# Patient Record
Sex: FEMALE | Race: WHITE | Employment: OTHER | URBAN - METROPOLITAN AREA
[De-identification: names, ages, dates, MRNs, and addresses within clinical notes are randomized per-mention and may not be internally consistent; named-entity substitution may affect disease eponyms.]

---

## 2017-04-11 ENCOUNTER — TRANSFERRED RECORDS (OUTPATIENT)
Dept: HEALTH INFORMATION MANAGEMENT | Facility: CLINIC | Age: 77
End: 2017-04-11

## 2017-07-20 ENCOUNTER — TRANSFERRED RECORDS (OUTPATIENT)
Dept: HEALTH INFORMATION MANAGEMENT | Facility: CLINIC | Age: 77
End: 2017-07-20

## 2017-09-25 ENCOUNTER — HOSPITAL ENCOUNTER (OUTPATIENT)
Dept: CARDIOLOGY | Facility: CLINIC | Age: 77
Discharge: HOME | End: 2017-09-25
Attending: INTERNAL MEDICINE | Admitting: INTERNAL MEDICINE
Payer: MEDICARE

## 2017-09-25 ENCOUNTER — OFFICE VISIT (OUTPATIENT)
Dept: CARDIOLOGY | Facility: CLINIC | Age: 77
End: 2017-09-25
Payer: COMMERCIAL

## 2017-09-25 VITALS
HEIGHT: 63 IN | HEART RATE: 80 BPM | SYSTOLIC BLOOD PRESSURE: 118 MMHG | BODY MASS INDEX: 28.53 KG/M2 | DIASTOLIC BLOOD PRESSURE: 70 MMHG | WEIGHT: 161 LBS

## 2017-09-25 DIAGNOSIS — I48.20 CHRONIC ATRIAL FIBRILLATION (H): Primary | ICD-10-CM

## 2017-09-25 DIAGNOSIS — I48.20 CHRONIC ATRIAL FIBRILLATION (H): ICD-10-CM

## 2017-09-25 PROCEDURE — 93306 TTE W/DOPPLER COMPLETE: CPT | Mod: 26 | Performed by: INTERNAL MEDICINE

## 2017-09-25 PROCEDURE — 99204 OFFICE O/P NEW MOD 45 MIN: CPT | Performed by: INTERNAL MEDICINE

## 2017-09-25 PROCEDURE — 93306 TTE W/DOPPLER COMPLETE: CPT

## 2017-09-25 RX ORDER — WARFARIN SODIUM 4 MG/1
4 TABLET ORAL DAILY
COMMUNITY

## 2017-09-25 RX ORDER — LEVOTHYROXINE SODIUM 75 UG/1
75 TABLET ORAL DAILY
COMMUNITY

## 2017-09-25 RX ORDER — CARVEDILOL 25 MG/1
25 TABLET ORAL 2 TIMES DAILY WITH MEALS
COMMUNITY
End: 2018-01-30

## 2017-09-25 RX ORDER — CLOBETASOL PROPIONATE 0.5 MG/G
OINTMENT TOPICAL 2 TIMES DAILY
COMMUNITY

## 2017-09-25 RX ORDER — LOSARTAN POTASSIUM 100 MG/1
100 TABLET ORAL DAILY
COMMUNITY
End: 2018-01-30

## 2017-09-25 RX ORDER — SPIRONOLACTONE 25 MG/1
12.5 TABLET ORAL DAILY
COMMUNITY
End: 2019-03-08

## 2017-09-25 RX ORDER — DIGOXIN 125 MCG
125 TABLET ORAL DAILY
COMMUNITY

## 2017-09-25 NOTE — LETTER
9/25/2017    Bryce Coley MD    [address] [address]     RE: Mary Odonnell Vianca       Dear Colleague,    I had the pleasure of seeing Mary Coley in the [facility].    It was my pleasure to see your mother, Mary Coley, who is a very pleasant 77-year-old female patient who in 2014 was diagnosed with asymptomatic atrial fibrillation.  She was seen by Dr. Hugh Rivera at [facility].  Because the patient was asymptomatic with the atrial fibrillation, he decided to go with rate controlling strategy using atenolol.  The patient's ejection fraction was 40%-45%.  He performed a coronary angiogram.  The coronary angiogram showed the patient had trivial coronary artery disease, so therefore, the patient really has 2 processes, 1 is chronic atrial fibrillation and the other is cardiomyopathy.  Dr. Rivera did prove that the patient's ventricular rate was well controlled on Holter monitoring with both a combination of atenolol and digoxin with ventricular rates averaging in the 80 beats per minute.        All appeared to be going well until 01/2017.  In 01/2017, the ejection fraction dropped to 30%-35%.  Again, Dr. Rivera correctly switched the patient from atenolol, which is not a beta blocker typically used in congestive heart failure or cardiomyopathy to carvedilol and increased the dose to 25 mg twice a day.  He also added a losartan, which is a typical heart failure and cardiomyopathy strategy.  He also placed the patient on spironolactone, so the treatment for this cardiomyopathy has been excellent.  Also, the Holter monitor was repeated, and again the heart rate is well controlled.  He was hoping the that with the more aggressive medical regimen that the ejection fraction would improved, but in April, the ejection fraction was still in the 30%-35% grady.  The atria are moderately enlarged.  Again, it appears to be 2  processes, 1 is cardiomyopathy and other is the atrial fibrillation.  Cardiomyopathy does run in your mother's family in that her sister also has a cardiomyopathy as well.      Dr. Rivera had the discussion with her mother about the possibility of defibrillator because of course with the EF in the 30%-35% range, she falls within the SCD-HeFT criteria for the implantation of a defibrillator.  The one question that I have is why the ejection fraction dropped from an initial value of 40%-45% to 30%-35%.  Even though the coronary angiogram in 2014 showed trivial disease, that does not necessarily mean that the coronary arteries are still normal, 3 years later.      Today, she is feeling well.  On physical examination, she appears to be euvolemic.  Her ventricular rate is well controlled at 80 beats per minute.  Her blood pressure is excellent at 118/70 and she is on a very good medical regimen for cardiomyopathy and for atrial fibrillation.      Outpatient Encounter Prescriptions as of 9/25/2017   Medication Sig Dispense Refill     calcium-vitamin D (CALTRATE 600+D) 600-400 MG-UNIT per tablet Take 1 tablet by mouth 2 times daily       carvedilol (COREG) 25 MG tablet Take 25 mg by mouth 2 times daily (with meals)       clobetasol (TEMOVATE) 0.05 % ointment Apply topically 2 times daily       digoxin (LANOXIN) 125 MCG tablet Take 125 mcg by mouth daily       levothyroxine (SYNTHROID/LEVOTHROID) 75 MCG tablet Take 75 mcg by mouth daily       losartan (COZAAR) 100 MG tablet Take 100 mg by mouth daily       predniSONE (ABA) 1 MG EC tablet Take 1 mg by mouth At Bedtime 2 tablets daily       ranitidine (ZANTAC) 150 MG tablet Take 150 mg by mouth 2 times daily       spironolactone (ALDACTONE) 25 MG tablet Take 12.5 mg by mouth daily       warfarin (COUMADIN) 4 MG tablet Take 4 mg by mouth daily Take as directed       No facility-administered encounter medications on file as of 9/25/2017.      IMPRESSION:   1.   Cardiomyopathy.  The ejection fraction, which was previously mildly reduced and proven not to be due to coronary artery disease, has worsened and has not responded to optimal medical therapy.   2.  Chronic atrial fibrillation.  The ventricular rate appears to be well controlled and she is correctly anticoagulated with warfarin.   3.  Family history of cardiomyopathy.  Her sister has cardiomyopathy also.   4.  Familial history of atrial fibrillation with the patient having atrial fibrillation but 2 sons also having atrial fibrillation.      PLAN:  Firstly, I do not think that converting the patient back to sinus rhythm would:  a) work now as she has been in atrial fibrillation for at least 3 years and the atria are moderately dilated or b:  I do not think that cardioverting her back to sinus rhythm is going to improve the ejection fraction since she is already well rate controlled and it is the heart rate that causes for cardiomyopathy, not necessarily being in atrial fibrillation or in sinus rhythm.        I do want to repeat the echocardiogram since it has been 5 months since the last echocardiogram to see if the ejection fraction has possibly improved.  I will also obtain a Holter monitor to confirm that the heart rate is well controlled.      If the ejection fraction has improved, then I would continue with the medical therapy.  If the ejection fraction is still in the 30%-35% range, I would perform an invasive coronary angiogram to ensure that there has been no development of coronary artery disease in the meantime.  If the ejection fraction remains reduced, she does fall within the criteria for the placement of an AICD.        I will try and get the echocardiogram done today and have a Holter monitor placed today and I will see her back again in the next few weeks.        Many thanks for allowing me to see your mother.     Sincerely,    Dieon Sorensen MD    [facility]  Care

## 2017-09-25 NOTE — PROGRESS NOTES
HPI and Plan:   See dictation    Orders Placed This Encounter   Procedures     Follow-Up with Cardiologist     Holter Monitor 48 hour - Adult     Echocardiogram       Orders Placed This Encounter   Medications     calcium-vitamin D (CALTRATE 600+D) 600-400 MG-UNIT per tablet     Sig: Take 1 tablet by mouth 2 times daily     carvedilol (COREG) 25 MG tablet     Sig: Take 25 mg by mouth 2 times daily (with meals)     clobetasol (TEMOVATE) 0.05 % ointment     Sig: Apply topically 2 times daily     digoxin (LANOXIN) 125 MCG tablet     Sig: Take 125 mcg by mouth daily     levothyroxine (SYNTHROID/LEVOTHROID) 75 MCG tablet     Sig: Take 75 mcg by mouth daily     losartan (COZAAR) 100 MG tablet     Sig: Take 100 mg by mouth daily     predniSONE (ABA) 1 MG EC tablet     Sig: Take 1 mg by mouth At Bedtime 2 tablets daily     ranitidine (ZANTAC) 150 MG tablet     Sig: Take 150 mg by mouth 2 times daily     spironolactone (ALDACTONE) 25 MG tablet     Sig: Take 12.5 mg by mouth daily     warfarin (COUMADIN) 4 MG tablet     Sig: Take 4 mg by mouth daily Take as directed       There are no discontinued medications.      Encounter Diagnosis   Name Primary?     Chronic atrial fibrillation (H) Yes       CURRENT MEDICATIONS:  Current Outpatient Prescriptions   Medication Sig Dispense Refill     calcium-vitamin D (CALTRATE 600+D) 600-400 MG-UNIT per tablet Take 1 tablet by mouth 2 times daily       carvedilol (COREG) 25 MG tablet Take 25 mg by mouth 2 times daily (with meals)       clobetasol (TEMOVATE) 0.05 % ointment Apply topically 2 times daily       digoxin (LANOXIN) 125 MCG tablet Take 125 mcg by mouth daily       levothyroxine (SYNTHROID/LEVOTHROID) 75 MCG tablet Take 75 mcg by mouth daily       losartan (COZAAR) 100 MG tablet Take 100 mg by mouth daily       predniSONE (ABA) 1 MG EC tablet Take 1 mg by mouth At Bedtime 2 tablets daily       ranitidine (ZANTAC) 150 MG tablet Take 150 mg by mouth 2 times daily        "spironolactone (ALDACTONE) 25 MG tablet Take 12.5 mg by mouth daily       warfarin (COUMADIN) 4 MG tablet Take 4 mg by mouth daily Take as directed         ALLERGIES   Allergies not on file    PAST MEDICAL HISTORY:  No past medical history on file.    PAST SURGICAL HISTORY:  No past surgical history on file.    FAMILY HISTORY:  No family history on file.    SOCIAL HISTORY:  Social History     Social History     Marital status:      Spouse name: N/A     Number of children: N/A     Years of education: N/A     Social History Main Topics     Smoking status: Never Smoker     Smokeless tobacco: Never Used     Alcohol use None     Drug use: None     Sexual activity: Not Asked     Other Topics Concern     None     Social History Narrative     None       Review of Systems:  Skin:  Negative       Eyes:  Negative      ENT:  Negative      Respiratory:  Negative       Cardiovascular:    Positive for;dizziness a-fib  Gastroenterology: Negative      Genitourinary:  Negative      Musculoskeletal:  Negative      Neurologic:  Negative      Psychiatric:  Negative      Heme/Lymph/Imm:  Negative      Endocrine:  Negative        Physical Exam:  Vitals: /70  Pulse 80  Ht 1.588 m (5' 2.5\")  Wt 73 kg (161 lb)  BMI 28.98 kg/m2    Constitutional:  cooperative, alert and oriented, well developed, well nourished, in no acute distress        Skin:  warm and dry to the touch, no apparent skin lesions or masses noted        Head:  normocephalic, no masses or lesions        Eyes:  pupils equal and round, conjunctivae and lids unremarkable, sclera white, no xanthalasma, EOMS intact, no nystagmus        ENT:  no pallor or cyanosis, dentition good        Neck:  carotid pulses are full and equal bilaterally, JVP normal, no carotid bruit, no thyromegaly        Chest:  normal breath sounds, clear to auscultation, normal A-P diameter, normal symmetry, normal respiratory excursion, no use of accessory muscles          Cardiac: regular " rhythm;normal S1 and S2;apical impulse not displaced   S4              Abdomen:  abdomen soft, non-tender, BS normoactive, no mass, no HSM, no bruits        Vascular: pulses full and equal, no bruits auscultated                                        Extremities and Back:  no deformities, clubbing, cyanosis, erythema observed;no edema              Neurological:  affect appropriate, oriented to time, person and place;no gross motor deficits              CC  No referring provider defined for this encounter.

## 2017-09-25 NOTE — MR AVS SNAPSHOT
After Visit Summary   9/25/2017    Mary Coley    MRN: [id number]           Patient Information     Date Of Birth          1940        Visit Information        Provider Department      9/25/2017 10:15 AM Deion Sorensen MD [facility] [place]        Today's Diagnoses     Chronic atrial fibrillation (H)    -  1       Follow-ups after your visit        Additional Services     Follow-Up with Cardiologist                 Your next 10 appointments already scheduled     Sep 25, 2017  3:00 PM CDT   Ech Complete with [id number]   [facility] CV Echocardiography (Cardiovascular Imaging at [facility])    [address]  [address]  [facility] [address] [phone]           1. Please bring or wear a comfortable two-piece outfit. 2. You may eat, drink and take your normal medicines. 3. For any questions that cannot be answered, please contact the ordering physician            Oct 06, 2017  3:45 PM CDT   Holter Monitor with RSCC DEVICE [facility] ([facility])    [address]  [facility] [address] [phone]           LOCATION - [address], [address] [place] [address] **Please check-in at the [place] [facility] Office, [address], in the [facility] building. When you are finished registering, please go to [address] and have a seat.            Oct 24, 2017 12:45 PM CDT   Return Visit with Deion Sorensen MD   [facility] [place] ([facility])    [address]  [facility] [address]   [phone]              Future tests that were ordered for you today     Open Future Orders        Priority Expected Expires Ordered    Follow-Up with Cardiologist Routine 10/9/2017 9/25/2018 9/25/2017    Holter Monitor 48 hour - Adult Routine 10/2/2017 9/25/2018 9/25/2017     "Echocardiogram Routine 2017            Who to contact     If you have questions or need follow up information about today's clinic visit or your schedule please contact [facility] [place] directly at [phone].  Normal or non-critical lab and imaging results will be communicated to you by MyChart, letter or phone within 4 business days after the clinic has received the results. If you do not hear from us within 7 days, please contact the clinic through MyChart or phone. If you have a critical or abnormal lab result, we will notify you by phone as soon as possible.  Submit refill requests through [employer] or call your pharmacy and they will forward the refill request to us. Please allow 3 business days for your refill to be completed.          Additional Information About Your Visit        MyChart Information     [employer] lets you send messages to your doctor, view your test results, renew your prescriptions, schedule appointments and more. To sign up, go to [email or web address] . Click on \"Log in\" on the left side of the screen, which will take you to the Welcome page. Then click on \"Sign up Now\" on the right side of the page.     You will be asked to enter the access code listed below, as well as some personal information. Please follow the directions to create your username and password.     Your access code is: [email or web address]  Expires: 2017 11:04 AM     Your access code will  in 90 days. If you need help or a new code, please call your [place] clinic or [phone].        Care EveryWhere ID     This is your Care EveryWhere ID. This could be used by other organizations to access your [place] medical records  [id number]        Your Vitals Were     Pulse Height BMI (Body Mass Index)             80 1.588 m (5' 2.5\") 28.98 kg/m2          Blood Pressure from Last 3 Encounters:   17 118/70    Weight from Last 3 Encounters:   17 73 " kg (161 lb)               Primary Care Provider    None Specified       No primary provider on file.        Equal Access to Services     JOSÉ HARDIN : Hadii aad ku hadorquideaede Luis, deniseda milagro, freddyjaci dotysaumyasarah salazar, lakshmi lealluisamarianne patel. So [facility] [phone].    ATENCIÓN: Si habla español, tiene a abbasi disposición servicios gratuitos de asistencia lingüística. Llame al [phone].    We comply with applicable federal civil rights laws and [place] laws. We do not discriminate on the basis of race, color, national origin, age, disability sex, sexual orientation or gender identity.            Thank you!     Thank you for choosing [facility] [place]  for your care. Our goal is always to provide you with excellent care. Hearing back from our patients is one way we can continue to improve our services. Please take a few minutes to complete the written survey that you may receive in the mail after your visit with us. Thank you!             Your Updated Medication List - Protect others around you: Learn how to safely use, store and throw away your medicines at www.disposemymeds.org.          This list is accurate as of: 9/25/17 11:04 AM.  Always use your most recent med list.                   Brand Name Dispense Instructions for use Diagnosis    CALTRATE 600+D 600-400 MG-UNIT per tablet   Generic drug:  calcium-vitamin D      Take 1 tablet by mouth 2 times daily        carvedilol 25 MG tablet    COREG     Take 25 mg by mouth 2 times daily (with meals)        clobetasol 0.05 % ointment    TEMOVATE     Apply topically 2 times daily        digoxin 125 MCG tablet    LANOXIN     Take 125 mcg by mouth daily        levothyroxine 75 MCG tablet    SYNTHROID/LEVOTHROID     Take 75 mcg by mouth daily        losartan 100 MG tablet    COZAAR     Take 100 mg by mouth daily        predniSONE 1 MG EC tablet    ABA     Take 1 mg by mouth At Bedtime 2 tablets daily         ranitidine 150 MG tablet    ZANTAC     Take 150 mg by mouth 2 times daily        spironolactone 25 MG tablet    ALDACTONE     Take 12.5 mg by mouth daily        warfarin 4 MG tablet    COUMADIN     Take 4 mg by mouth daily Take as directed

## 2017-09-25 NOTE — PROGRESS NOTES
HISTORY OF PRESENT ILLNESS:  It was my pleasure to see your mother, Mary Coley, who is a very pleasant 77-year-old female patient who in 2014 was diagnosed with asymptomatic atrial fibrillation.  She was seen by Dr. Hugh Rivera at [facility].  Because the patient was asymptomatic with the atrial fibrillation, he decided to go with rate controlling strategy using atenolol.  The patient's ejection fraction was 40%-45%.  He performed a coronary angiogram.  The coronary angiogram showed the patient had trivial coronary artery disease, so therefore, the patient really has 2 processes, 1 is chronic atrial fibrillation and the other is cardiomyopathy.  Dr. Rivera did prove that the patient's ventricular rate was well controlled on Holter monitoring with both a combination of atenolol and digoxin with ventricular rates averaging in the 80 beats per minute.        All appeared to be going well until 01/2017.  In 01/2017, the ejection fraction dropped to 30%-35%.  Again, Dr. Rivera correctly switched the patient from atenolol, which is not a beta blocker typically used in congestive heart failure or cardiomyopathy to carvedilol and increased the dose to 25 mg twice a day.  He also added a losartan, which is a typical heart failure and cardiomyopathy strategy.  He also placed the patient on spironolactone, so the treatment for this cardiomyopathy has been excellent.  Also, the Holter monitor was repeated, and again the heart rate is well controlled.  He was hoping the that with the more aggressive medical regimen that the ejection fraction would improved, but in April, the ejection fraction was still in the 30%-35% grady.  The atria are moderately enlarged.  Again, it appears to be 2 processes, 1 is cardiomyopathy and other is the atrial fibrillation.  Cardiomyopathy does run in your mother's family in that her sister also has a cardiomyopathy as well.      Dr. Rivera had the discussion with her mother about  the possibility of defibrillator because of course with the EF in the 30%-35% range, she falls within the SCD-HeFT criteria for the implantation of a defibrillator.  The one question that I have is why the ejection fraction dropped from an initial value of 40%-45% to 30%-35%.  Even though the coronary angiogram in 2014 showed trivial disease, that does not necessarily mean that the coronary arteries are still normal, 3 years later.      Today, she is feeling well.  On physical examination, she appears to be euvolemic.  Her ventricular rate is well controlled at 80 beats per minute.  Her blood pressure is excellent at 118/70 and she is on a very good medical regimen for cardiomyopathy and for atrial fibrillation.      IMPRESSION:   1.  Cardiomyopathy.  The ejection fraction, which was previously mildly reduced and proven not to be due to coronary artery disease, has worsened and has not responded to optimal medical therapy.   2.  Chronic atrial fibrillation.  The ventricular rate appears to be well controlled and she is correctly anticoagulated with warfarin.   3.  Family history of cardiomyopathy.  Her sister has cardiomyopathy also.   4.  Familial history of atrial fibrillation with the patient having atrial fibrillation but 2 sons also having atrial fibrillation.      PLAN:  Firstly, I do not think that converting the patient back to sinus rhythm would:  a) work now as she has been in atrial fibrillation for at least 3 years and the atria are moderately dilated or b:  I do not think that cardioverting her back to sinus rhythm is going to improve the ejection fraction since she is already well rate controlled and it is the heart rate that causes for cardiomyopathy, not necessarily being in atrial fibrillation or in sinus rhythm.        I do want to repeat the echocardiogram since it has been 5 months since the last echocardiogram to see if the ejection fraction has possibly improved.  I will also obtain a Holter  monitor to confirm that the heart rate is well controlled.      If the ejection fraction has improved, then I would continue with the medical therapy.  If the ejection fraction is still in the 30%-35% range, I would perform an invasive coronary angiogram to ensure that there has been no development of coronary artery disease in the meantime.  If the ejection fraction remains reduced, she does fall within the criteria for the placement of an AICD.        I will try and get the echocardiogram done today and have a Holter monitor placed today and I will see her back again in the next few weeks.      Many thanks for allowing me to see your mother.      cc:      [place] [address]         DANY VALDEZ MD, [facility]             D: 2017 11:07   T: 2017 13:24   MT: ANDREW      Name:     JOHANA COLEY   MRN:      [id number]        Account:      [id number]   :      1940           Service Date: 2017      Document: [id number]

## 2017-10-06 ENCOUNTER — HOSPITAL ENCOUNTER (OUTPATIENT)
Dept: CARDIOLOGY | Facility: CLINIC | Age: 77
Discharge: HOME | End: 2017-10-06
Attending: INTERNAL MEDICINE | Admitting: INTERNAL MEDICINE
Payer: MEDICARE

## 2017-10-06 DIAGNOSIS — I48.20 CHRONIC ATRIAL FIBRILLATION (H): ICD-10-CM

## 2017-10-06 PROCEDURE — 93227 XTRNL ECG REC<48 HR R&I: CPT | Performed by: INTERNAL MEDICINE

## 2017-10-06 PROCEDURE — 93226 XTRNL ECG REC<48 HR SCAN A/R: CPT

## 2017-10-24 ENCOUNTER — OFFICE VISIT (OUTPATIENT)
Dept: CARDIOLOGY | Facility: CLINIC | Age: 77
End: 2017-10-24
Attending: INTERNAL MEDICINE
Payer: COMMERCIAL

## 2017-10-24 VITALS
SYSTOLIC BLOOD PRESSURE: 150 MMHG | WEIGHT: 162.3 LBS | HEART RATE: 80 BPM | DIASTOLIC BLOOD PRESSURE: 82 MMHG | BODY MASS INDEX: 28.76 KG/M2 | HEIGHT: 63 IN

## 2017-10-24 DIAGNOSIS — I48.20 CHRONIC ATRIAL FIBRILLATION (H): ICD-10-CM

## 2017-10-24 DIAGNOSIS — I42.8 OTHER CARDIOMYOPATHY (H): Primary | ICD-10-CM

## 2017-10-24 PROCEDURE — 99213 OFFICE O/P EST LOW 20 MIN: CPT | Performed by: INTERNAL MEDICINE

## 2017-10-24 NOTE — PROGRESS NOTES
REFERRING AND PRIMARY CARE PHYSICIAN:  Sagrario Guerin MD.      HISTORY OF PRESENT ILLNESS:  It was my pleasure to see your patient, Mary Coley, in followup.  She is a very pleasant 77-year-old female patient with chronic atrial fibrillation who was also diagnosed with a cardiomyopathy.  This was shown to be an idiopathic cardiomyopathy as she had coronary angiography in 2014 showing only trivial coronary artery disease.  In January of this year, if you remember, her ejection fraction dropped into the 30%-35% range.  It was unclear why the ejection fraction dropped.  The patient was switched to carvedilol, a beta blocker which is best for cardiomyopathy. With that background in mind, we repeated the echocardiogram on 09/25, and this has shown that the ejection fraction now has significantly improved.  The EF is in the 45%-50% range.  No significant valvular heart disease is present.  The Holter monitor shows that she has good rate control.  The average heart rate was 85 beats per minute with no significant pauses.  The patient therefore has good rate control.  Mrs. Coley feels well.  She has no shortness of breath.  She has no chest discomfort.  She has no ankle edema.      IMPRESSION:   1.  Chronic atrial fibrillation.  The patient has good rate control with an average heart rate of 85 beats per minute.   2.  Idiopathic cardiomyopathy.  The patient's ejection fraction has significantly improved.  She has risen from the 30%-35% range to the 45%-50% range.   3.  Chronically anticoagulated with warfarin without bleeding complications.   4.  Blood pressure was high today, but the most likely reason for this was that she thought her visit was at [place], but it was in [place] and they were rushing down to get here.  I repeated her blood pressure, and it dropped from 150/82 to 142/82 in a matter of 10 minutes.  Her blood pressure normally is very well controlled, and 1 month ago her blood pressure was 118/70.       PLAN:  We will continue the patient with good rate control and anticoagulation.  I will see her back again in 6 months' time.  In a year's time I will repeat her echocardiogram again.  As always, she has been told to contact me if she has any questions or any concerns.  It has been my pleasure to be involved in the care of this very nice patient.      cc:   [place] [address]         DANY VALDEZ MD, [facility]             D: 10/24/2017 13:10   T: 10/24/2017 15:11   MT: NISH      Name:     JOHANA HAWKINS   MRN:      [id number]        Account:      [id number]   :      1940           Service Date: 10/24/2017      Document: [id number]

## 2017-10-24 NOTE — LETTER
10/24/2017    JOSE ROTHMAN  [facility]   [address]  [facility] [address]    RE: Mary Coley       Dear Colleague,    I had the pleasure of seeing Mary Coley in the [facility].    REFERRING AND PRIMARY CARE PHYSICIAN:  Jose Rothman MD.      It was my pleasure to see your patient, Mary Coley, in followup.  She is a very pleasant 77-year-old female patient with chronic atrial fibrillation who was also diagnosed with a cardiomyopathy.  This was shown to be an idiopathic cardiomyopathy as she had coronary angiography in 2014 showing only trivial coronary artery disease.  In January of this year, if you remember, her ejection fraction dropped into the 30%-35% range.  It was unclear why the ejection fraction dropped.  The patient was switched to carvedilol, a beta blocker which is best for cardiomyopathy. With that background in mind, we repeated the echocardiogram on 09/25, and this has shown that the ejection fraction now has significantly improved.  The EF is in the 45%-50% range.  No significant valvular heart disease is present.  The Holter monitor shows that she has good rate control.  The average heart rate was 85 beats per minute with no significant pauses.  The patient therefore has good rate control.  Mrs. Coley feels well.  She has no shortness of breath.  She has no chest discomfort.  She has no ankle edema.     Outpatient Encounter Prescriptions as of 10/24/2017   Medication Sig Dispense Refill     Multiple Vitamins-Minerals (PRESERVISION AREDS PO) Take 1 tablet by mouth daily       OMEPRAZOLE PO Take 20 mg by mouth as needed       calcium-vitamin D (CALTRATE 600+D) 600-400 MG-UNIT per tablet Take 1 tablet by mouth 2 times daily       carvedilol (COREG) 25 MG tablet Take 25 mg by mouth 2 times daily (with meals)       clobetasol (TEMOVATE) 0.05 % ointment Apply topically 2 times daily       digoxin (LANOXIN) 125 MCG tablet Take 125  mcg by mouth daily       levothyroxine (SYNTHROID/LEVOTHROID) 75 MCG tablet Take 75 mcg by mouth daily       losartan (COZAAR) 100 MG tablet Take 100 mg by mouth daily       predniSONE (ABA) 1 MG EC tablet Take 1 mg by mouth At Bedtime 2 tablets daily prn       spironolactone (ALDACTONE) 25 MG tablet Take 12.5 mg by mouth daily       warfarin (COUMADIN) 4 MG tablet Take 4 mg by mouth daily Take as directed       [DISCONTINUED] ranitidine (ZANTAC) 150 MG tablet Take 150 mg by mouth 2 times daily       No facility-administered encounter medications on file as of 10/24/2017.       IMPRESSION:   1.  Chronic atrial fibrillation.  The patient has good rate control with an average heart rate of 85 beats per minute.   2.  Idiopathic cardiomyopathy.  The patient's ejection fraction has significantly improved.  She has risen from the 30%-35% range to the 45%-50% range.   3.  Chronically anticoagulated with warfarin without bleeding complications.   4.  Blood pressure was high today, but the most likely reason for this was that she thought her visit was at [place], but it was in [place] and they were rushing down to get here.  I repeated her blood pressure, and it dropped from 150/82 to 142/82 in a matter of 10 minutes.  Her blood pressure normally is very well controlled, and 1 month ago her blood pressure was 118/70.      PLAN:  We will continue the patient with good rate control and anticoagulation.  I will see her back again in 6 months' time.  In a year's time I will repeat her echocardiogram again.  As always, she has been told to contact me if she has any questions or any concerns.  It has been my pleasure to be involved in the care of this very nice patient.     Again, thank you for allowing me to participate in the care of your patient.      Sincerely,    Deion Sorensen MD     [facility]

## 2017-10-24 NOTE — MR AVS SNAPSHOT
"              After Visit Summary   10/24/2017    Mary Coley    MRN: [id number]           Patient Information     Date Of Birth          1940        Visit Information        Provider Department      10/24/2017 12:45 PM Deion Sorensen MD [facility]        Today's Diagnoses     Other cardiomyopathy (H)    -  1    Chronic atrial fibrillation (H)           Follow-ups after your visit        Additional Services     Follow-Up with Cardiologist                 Future tests that were ordered for you today     Open Future Orders        Priority Expected Expires Ordered    EKG 12-lead complete w/read - Clinics Routine 4/22/2018 10/24/2018 10/24/2017    Follow-Up with Cardiologist Routine 4/22/2018 10/24/2018 10/24/2017            Who to contact     If you have questions or need follow up information about today's clinic visit or your schedule please contact [facility] [place] directly at [phone].  Normal or non-critical lab and imaging results will be communicated to you by Reef Point Systemshart, letter or phone within 4 business days after the clinic has received the results. If you do not hear from us within 7 days, please contact the clinic through Re[employer] Point Systemshart or phone. If you have a critical or abnormal lab result, we will notify you by phone as soon as possible.  Submit refill requests through expressor software or call your pharmacy and they will forward the refill request to us. Please allow 3 business days for your refill to be completed.          Additional Information About Your Visit        Reef Point SystemsharCoraid Information     expressor software lets you send messages to your doctor, view your test results, renew your prescriptions, schedule appointments and more. To sign up, go to [email or web address] . Click on \"Log in\" on the left side of the screen, which will take you to the Welcome page. Then click on \"Sign up Now\" on the right side of the page.     You " "will be asked to enter the access code listed below, as well as some personal information. Please follow the directions to create your username and password.     Your access code is: [email or web address]  Expires: 2017 11:04 AM     Your access code will  in 90 days. If you need help or a new code, please call your [facility] clinic or [phone].        Care EveryWhere ID     This is your Care EveryWhere ID. This could be used by other organizations to access your [facility] medical records  [id number]        Your Vitals Were     Pulse Height BMI (Body Mass Index)             80 1.588 m (5' 2.5\") 29.21 kg/m2          Blood Pressure from Last 3 Encounters:   10/24/17 150/82   17 118/70    Weight from Last 3 Encounters:   10/24/17 73.6 kg (162 lb 4.8 oz)   17 73 kg (161 lb)              We Performed the Following     Follow-Up with Cardiologist          Today's Medication Changes          These changes are accurate as of: 10/24/17  1:11 PM.  If you have any questions, ask your nurse or doctor.               Stop taking these medicines if you haven't already. Please contact your care team if you have questions.     ranitidine 150 MG tablet   Commonly known as:  ZANTAC   Stopped by:  Deion Sorensen MD                    Primary Care Provider Office Phone # Fax #    Sagrario Guerin [phone]       [address] [address]        Equal Access to Services     [facility] AH: Hadii aad ku hadasho Soomaali, waaxda luqadaha, qaybta kaalmada adeegyada, lakshmi archer . So [facility] [phone].    ATENCIÓN: Si habla español, tiene a abbasi disposición servicios gratuitos de asistencia lingüística. Llame al [phone].    We comply with applicable federal civil rights laws and [place] laws. We do not discriminate on the basis of race, color, national origin, age, disability, sex, sexual orientation, or gender identity.          "   Thank you!     Thank you for choosing [facility] [place]  for your care. Our goal is always to provide you with excellent care. Hearing back from our patients is one way we can continue to improve our services. Please take a few minutes to complete the written survey that you may receive in the mail after your visit with us. Thank you!             Your Updated Medication List - Protect others around you: Learn how to safely use, store and throw away your medicines at www.disposemymeds.org.          This list is accurate as of: 10/24/17  1:11 PM.  Always use your most recent med list.                   Brand Name Dispense Instructions for use Diagnosis    CALTRATE 600+D 600-400 MG-UNIT per tablet   Generic drug:  calcium-vitamin D      Take 1 tablet by mouth 2 times daily        carvedilol 25 MG tablet    COREG     Take 25 mg by mouth 2 times daily (with meals)        clobetasol 0.05 % ointment    TEMOVATE     Apply topically 2 times daily        digoxin 125 MCG tablet    LANOXIN     Take 125 mcg by mouth daily        levothyroxine 75 MCG tablet    SYNTHROID/LEVOTHROID     Take 75 mcg by mouth daily        losartan 100 MG tablet    COZAAR     Take 100 mg by mouth daily        OMEPRAZOLE PO      Take 20 mg by mouth as needed        predniSONE 1 MG EC tablet    ABA     Take 1 mg by mouth At Bedtime 2 tablets daily prn        PRESERVISION AREDS PO      Take 1 tablet by mouth daily        spironolactone 25 MG tablet    ALDACTONE     Take 12.5 mg by mouth daily        warfarin 4 MG tablet    COUMADIN     Take 4 mg by mouth daily Take as directed

## 2017-10-24 NOTE — PROGRESS NOTES
HPI and Plan:   See dictation    Orders Placed This Encounter   Procedures     Follow-Up with Cardiologist     EKG 12-lead complete w/read - Clinics       Orders Placed This Encounter   Medications     Multiple Vitamins-Minerals (PRESERVISION AREDS PO)     Sig: Take 1 tablet by mouth daily     OMEPRAZOLE PO     Sig: Take 20 mg by mouth as needed       Medications Discontinued During This Encounter   Medication Reason     ranitidine (ZANTAC) 150 MG tablet          Encounter Diagnoses   Name Primary?     Chronic atrial fibrillation (H)      Other cardiomyopathy (H) Yes       CURRENT MEDICATIONS:  Current Outpatient Prescriptions   Medication Sig Dispense Refill     Multiple Vitamins-Minerals (PRESERVISION AREDS PO) Take 1 tablet by mouth daily       OMEPRAZOLE PO Take 20 mg by mouth as needed       calcium-vitamin D (CALTRATE 600+D) 600-400 MG-UNIT per tablet Take 1 tablet by mouth 2 times daily       carvedilol (COREG) 25 MG tablet Take 25 mg by mouth 2 times daily (with meals)       clobetasol (TEMOVATE) 0.05 % ointment Apply topically 2 times daily       digoxin (LANOXIN) 125 MCG tablet Take 125 mcg by mouth daily       levothyroxine (SYNTHROID/LEVOTHROID) 75 MCG tablet Take 75 mcg by mouth daily       losartan (COZAAR) 100 MG tablet Take 100 mg by mouth daily       predniSONE (ABA) 1 MG EC tablet Take 1 mg by mouth At Bedtime 2 tablets daily prn       spironolactone (ALDACTONE) 25 MG tablet Take 12.5 mg by mouth daily       warfarin (COUMADIN) 4 MG tablet Take 4 mg by mouth daily Take as directed         ALLERGIES   Allergies not on file    PAST MEDICAL HISTORY:  No past medical history on file.    PAST SURGICAL HISTORY:  No past surgical history on file.    FAMILY HISTORY:  No family history on file.    SOCIAL HISTORY:  Social History     Social History     Marital status:      Spouse name: N/A     Number of children: N/A     Years of education: N/A     Social History Main Topics     Smoking status:  "Never Smoker     Smokeless tobacco: Never Used     Alcohol use None     Drug use: None     Sexual activity: Not Asked     Other Topics Concern     None     Social History Narrative       Review of Systems:  Skin:  Negative       Eyes:  Negative      ENT:  Negative      Respiratory:  Negative       Cardiovascular:    Positive for;dizziness (when standing too fast)    Gastroenterology: Negative      Genitourinary:  Negative      Musculoskeletal:  Positive for arthritis    Neurologic:  Negative      Psychiatric:  Negative      Heme/Lymph/Imm:  Negative      Endocrine:  Negative        Physical Exam:  Vitals: /82  Pulse 80  Ht 1.588 m (5' 2.5\")  Wt 73.6 kg (162 lb 4.8 oz)  BMI 29.21 kg/m2    Constitutional:  cooperative, alert and oriented, well developed, well nourished, in no acute distress        Skin:  warm and dry to the touch, no apparent skin lesions or masses noted        Head:  normocephalic, no masses or lesions        Eyes:  pupils equal and round, conjunctivae and lids unremarkable, sclera white, no xanthalasma, EOMS intact, no nystagmus        ENT:  no pallor or cyanosis, dentition good        Neck:  carotid pulses are full and equal bilaterally, JVP normal, no carotid bruit, no thyromegaly        Chest:  normal breath sounds, clear to auscultation, normal A-P diameter, normal symmetry, normal respiratory excursion, no use of accessory muscles          Cardiac: regular rhythm;normal S1 and S2;apical impulse not displaced   S4              Abdomen:  abdomen soft, non-tender, BS normoactive, no mass, no HSM, no bruits        Vascular: pulses full and equal, no bruits auscultated                                        Extremities and Back:  no deformities, clubbing, cyanosis, erythema observed;no edema              Neurological:  affect appropriate, oriented to time, person and place;no gross motor deficits              CC  Deion Sorensen MD  [address]  [place] " [address]

## 2018-01-30 DIAGNOSIS — I25.5 ISCHEMIC CARDIOMYOPATHY: ICD-10-CM

## 2018-01-30 DIAGNOSIS — I48.91 ATRIAL FIBRILLATION (H): Primary | ICD-10-CM

## 2018-01-30 RX ORDER — LOSARTAN POTASSIUM 100 MG/1
100 TABLET ORAL DAILY
Qty: 90 TABLET | Refills: 3 | Status: SHIPPED | OUTPATIENT
Start: 2018-01-30 | End: 2019-01-18

## 2018-01-30 RX ORDER — CARVEDILOL 25 MG/1
25 TABLET ORAL 2 TIMES DAILY WITH MEALS
Qty: 180 TABLET | Refills: 3 | Status: SHIPPED | OUTPATIENT
Start: 2018-01-30 | End: 2019-01-18

## 2018-05-18 ENCOUNTER — OFFICE VISIT (OUTPATIENT)
Dept: CARDIOLOGY | Facility: CLINIC | Age: 78
End: 2018-05-18
Attending: INTERNAL MEDICINE
Payer: COMMERCIAL

## 2018-05-18 VITALS
BODY MASS INDEX: 28.53 KG/M2 | HEART RATE: 73 BPM | WEIGHT: 161 LBS | SYSTOLIC BLOOD PRESSURE: 118 MMHG | DIASTOLIC BLOOD PRESSURE: 71 MMHG | HEIGHT: 63 IN

## 2018-05-18 DIAGNOSIS — I42.8 OTHER CARDIOMYOPATHY (H): ICD-10-CM

## 2018-05-18 DIAGNOSIS — I48.20 CHRONIC ATRIAL FIBRILLATION (H): ICD-10-CM

## 2018-05-18 PROCEDURE — 99213 OFFICE O/P EST LOW 20 MIN: CPT | Performed by: INTERNAL MEDICINE

## 2018-05-18 PROCEDURE — 93000 ELECTROCARDIOGRAM COMPLETE: CPT | Performed by: INTERNAL MEDICINE

## 2018-05-18 NOTE — MR AVS SNAPSHOT
After Visit Summary   5/18/2018    Mary Coley    MRN: [id number]           Patient Information     Date Of Birth          1940        Visit Information        Provider Department      5/18/2018 9:45 AM Deion Sorensen MD [facility]        Today's Diagnoses     Chronic atrial fibrillation (H)        Other cardiomyopathy (H)           Follow-ups after your visit        Additional Services     Follow-Up with Cardiologist                 Future tests that were ordered for you today     Open Future Orders        Priority Expected Expires Ordered    Digoxin level Routine 11/19/2018 5/18/2019 5/18/2018    Basic metabolic panel Routine 11/14/2018 5/18/2019 5/18/2018    Echocardiogram Routine 11/14/2018 5/18/2019 5/18/2018    Basic metabolic panel Routine 11/14/2018 5/18/2019 5/18/2018    Follow-Up with Cardiologist Routine 11/14/2018 5/18/2019 5/18/2018            Who to contact     If you have questions or need follow up information about today's clinic visit or your schedule please contact [facility] directly at [phone].  Normal or non-critical lab and imaging results will be communicated to you by Lifefactoryhart, letter or phone within 4 business days after the clinic has received the results. If you do not hear from us within 7 days, please contact the clinic through Dartfisht or phone. If you have a critical or abnormal lab result, we will notify you by phone as soon as possible.  Submit refill requests through SocialPicks or call your pharmacy and they will forward the refill request to us. Please allow 3 business days for your refill to be completed.          Additional Information About Your Visit        MyChart Information     SocialPicks lets you send messages to your doctor, view your test results, renew your prescriptions, schedule appointments and more. To sign up, go to [email or web address] .  "Click on \"Log in\" on the left side of the screen, which will take you to the Welcome page. Then click on \"Sign up Now\" on the right side of the page.     You will be asked to enter the access code listed below, as well as some personal information. Please follow the directions to create your username and password.     Your access code is: [email or web address]  Expires: 2018 10:34 AM     Your access code will  in 90 days. If you need help or a new code, please call your [facility] clinic or [phone].        Care EveryWhere ID     This is your Care EveryWhere ID. This could be used by other organizations to access your [facility] medical records  [id number]        Your Vitals Were     Pulse Height BMI (Body Mass Index)             73 1.588 m (5' 2.5\") 28.98 kg/m2          Blood Pressure from Last 3 Encounters:   18 118/71   10/24/17 150/82   17 118/70    Weight from Last 3 Encounters:   18 73 kg (161 lb)   10/24/17 73.6 kg (162 lb 4.8 oz)   17 73 kg (161 lb)              We Performed the Following     EKG 12-lead complete w/read - Clinics (performed today)     Follow-Up with Cardiologist        Primary Care Provider Office Phone # Fax #    Sagrario Guerin [phone]       [address] [address]        Equal Access to Services     JOSÉ HARDIN : Hadii aad ku hadasho Soomaali, waaxda luqadaha, qaybta kaalmada adeegyada, lakshmi archer . So [facility] [phone].    ATENCIÓN: Si habla devinañol, tiene a abbasi disposición servicios gratuitos de asistencia lingüística. Llame al [phone].    We comply with applicable federal civil rights laws and [place] laws. We do not discriminate on the basis of race, color, national origin, age, disability, sex, sexual orientation, or gender identity.            Thank you!     Thank you for choosing [facility]  for your care. Our goal is " always to provide you with excellent care. Hearing back from our patients is one way we can continue to improve our services. Please take a few minutes to complete the written survey that you may receive in the mail after your visit with us. Thank you!             Your Updated Medication List - Protect others around you: Learn how to safely use, store and throw away your medicines at www.disposemymeds.org.          This list is accurate as of 5/18/18 10:34 AM.  Always use your most recent med list.                   Brand Name Dispense Instructions for use Diagnosis    CALTRATE 600+D 600-400 MG-UNIT per tablet   Generic drug:  calcium-vitamin D      Take 1 tablet by mouth 2 times daily        carvedilol 25 MG tablet    COREG    180 tablet    Take 1 tablet (25 mg) by mouth 2 times daily (with meals)    Atrial fibrillation (H)       clobetasol 0.05 % ointment    TEMOVATE     Apply topically 2 times daily        digoxin 125 MCG tablet    LANOXIN     Take 125 mcg by mouth daily        levothyroxine 75 MCG tablet    SYNTHROID/LEVOTHROID     Take 75 mcg by mouth daily 100 mcg daily        losartan 100 MG tablet    COZAAR    90 tablet    Take 1 tablet (100 mg) by mouth daily    Ischemic cardiomyopathy       OMEPRAZOLE PO      Take 20 mg by mouth as needed        predniSONE 1 MG EC tablet    ABA     Take 1 mg by mouth At Bedtime 2 tablets daily prn        PRESERVISION AREDS PO      Take 1 tablet by mouth daily        spironolactone 25 MG tablet    ALDACTONE     Take 12.5 mg by mouth daily        warfarin 4 MG tablet    COUMADIN     Take 4 mg by mouth daily Take as directed

## 2018-05-18 NOTE — PROGRESS NOTES
Service Date: 05/18/2018      REFERRING PHYSICIAN:  Dr. Sagrario Guerin.       HISTORY OF PRESENT ILLNESS:  It is my pleasure to see your patient, Mary Coley, in followup.  As you know, Mrs. Coley is a 78-year-old female patient with chronic atrial fibrillation.  The patient was noted to have a cardiomyopathy with an EF in the 30-35% range.  She did have a coronary angiogram performed which showed only trivial coronary artery disease.  It was felt that the cardiomyopathy was either due to chronic atrial fibrillation with less than optimal rate control or a primary cardiomyopathy.  The patient was switched from atenolol to carvedilol, a drug which is more appropriate for cardiomyopathy, and her last echocardiogram which was performed on 09/25/2017 showed that her ejection fraction had increased to the 45-50% range.      With that background in mind, she is feeling well.  She has no chest pains, no chest pressure and no shortness of breath.  She does not have any ankle edema, orthopnea or PND.  She is a very active person and is able to do her activities without any difficulty at all.  Her EKG showed that her ventricular rate is well controlled today.  The ventricular rate was 64 beats per minute.  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      IMPRESSION:   1.  Chronic atrial fibrillation.  The patient is anticoagulated with warfarin and has good rate control with both carvedilol and digoxin.   2.  Normotensive.   3.  Trivial coronary artery disease on coronary angiography.   4.  Essential hypertension with good blood pressure control.      PLAN:  I will see the patient back again in 6 months' time and at that time we will repeat her echocardiogram to look at left ventricular systolic function.  We should also check a trough digoxin level at that time.  We will also check a basic metabolic profile as she is on chronic spironolactone therapy to ensure that she is not  becoming hyperkalemic.  It is my pleasure to be involved in the care of this very nice patient.  I look forward to seeing her again.      cc:   [place] [address]         DANY VALDEZ MD, [facility]             D: 2018   T: 2018   MT: ADINA      Name:     JOHANA HAWKINS   MRN:      [id number]        Account:      [id number]   :      1940           Service Date: 2018      Document: [id number]

## 2018-05-18 NOTE — LETTER
5/18/2018      JOSE ROTHMAN  [facility] [address]  [facility] [address]      RE: Mary Coley       Dear Colleague,    I had the pleasure of seeing Mary Coley in the [facility].    Service Date: 05/18/2018      REFERRING PHYSICIAN:  Dr. Jose Rothman.       HISTORY OF PRESENT ILLNESS:  It is my pleasure to see your patient, Mary Coley, in followup.  As you know, Mrs. Coley is a 78-year-old female patient with chronic atrial fibrillation.  The patient was noted to have a cardiomyopathy with an EF in the 30-35% range.  She did have a coronary angiogram performed which showed only trivial coronary artery disease.  It was felt that the cardiomyopathy was either due to chronic atrial fibrillation with less than optimal rate control or a primary cardiomyopathy.  The patient was switched from atenolol to carvedilol, a drug which is more appropriate for cardiomyopathy, and her last echocardiogram which was performed on 09/25/2017 showed that her ejection fraction had increased to the 45-50% range.      With that background in mind, she is feeling well.  She has no chest pains, no chest pressure and no shortness of breath.  She does not have any ankle edema, orthopnea or PND.  She is a very active person and is able to do her activities without any difficulty at all.  Her EKG showed that her ventricular rate is well controlled today.  The ventricular rate was 64 beats per minute.  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      IMPRESSION:   1.  Chronic atrial fibrillation.  The patient is anticoagulated with warfarin and has good rate control with both carvedilol and digoxin.   2.  Normotensive.   3.  Trivial coronary artery disease on coronary angiography.   4.  Essential hypertension with good blood pressure control.      PLAN:  I will see the patient back again in 6 months' time and at that time we will  repeat her echocardiogram to look at left ventricular systolic function.  We should also check a trough digoxin level at that time.  We will also check a basic metabolic profile as she is on chronic spironolactone therapy to ensure that she is not becoming hyperkalemic.  It is my pleasure to be involved in the care of this very nice patient.  I look forward to seeing her again.      cc:   [place] [address]         DEION VALDEZ MD, [facility]             D: 2018   T: 2018   MT: ADINA      Name:     JOHANA HAWKINS   MRN:      [id number]        Account:      [id number]   :      1940           Service Date: 2018      Document: [id number]         Outpatient Encounter Prescriptions as of 2018   Medication Sig Dispense Refill     calcium-vitamin D (CALTRATE 600+D) 600-400 MG-UNIT per tablet Take 1 tablet by mouth 2 times daily       carvedilol (COREG) 25 MG tablet Take 1 tablet (25 mg) by mouth 2 times daily (with meals) 180 tablet 3     clobetasol (TEMOVATE) 0.05 % ointment Apply topically 2 times daily       digoxin (LANOXIN) 125 MCG tablet Take 125 mcg by mouth daily       levothyroxine (SYNTHROID/LEVOTHROID) 75 MCG tablet Take 75 mcg by mouth daily 100 mcg daily       losartan (COZAAR) 100 MG tablet Take 1 tablet (100 mg) by mouth daily 90 tablet 3     Multiple Vitamins-Minerals (PRESERVISION AREDS PO) Take 1 tablet by mouth daily       OMEPRAZOLE PO Take 20 mg by mouth as needed       predniSONE (ABA) 1 MG EC tablet Take 1 mg by mouth At Bedtime 2 tablets daily prn       spironolactone (ALDACTONE) 25 MG tablet Take 12.5 mg by mouth daily       warfarin (COUMADIN) 4 MG tablet Take 4 mg by mouth daily Take as directed       No facility-administered encounter medications on file as of 2018.        Again, thank you for allowing me to participate in the care of your patient.      Sincerely,    Deion Rivas  MD Edu, MD     [facility]

## 2018-05-18 NOTE — PROGRESS NOTES
HPI and Plan:   See dictation    Orders Placed This Encounter   Procedures     Basic metabolic panel     Basic metabolic panel     Digoxin level     Follow-Up with Cardiologist     EKG 12-lead complete w/read - Clinics (performed today)     Echocardiogram       No orders of the defined types were placed in this encounter.      There are no discontinued medications.      Encounter Diagnoses   Name Primary?     Chronic atrial fibrillation (H)      Other cardiomyopathy (H)        CURRENT MEDICATIONS:  Current Outpatient Prescriptions   Medication Sig Dispense Refill     calcium-vitamin D (CALTRATE 600+D) 600-400 MG-UNIT per tablet Take 1 tablet by mouth 2 times daily       carvedilol (COREG) 25 MG tablet Take 1 tablet (25 mg) by mouth 2 times daily (with meals) 180 tablet 3     clobetasol (TEMOVATE) 0.05 % ointment Apply topically 2 times daily       digoxin (LANOXIN) 125 MCG tablet Take 125 mcg by mouth daily       levothyroxine (SYNTHROID/LEVOTHROID) 75 MCG tablet Take 75 mcg by mouth daily 100 mcg daily       losartan (COZAAR) 100 MG tablet Take 1 tablet (100 mg) by mouth daily 90 tablet 3     Multiple Vitamins-Minerals (PRESERVISION AREDS PO) Take 1 tablet by mouth daily       OMEPRAZOLE PO Take 20 mg by mouth as needed       predniSONE (ABA) 1 MG EC tablet Take 1 mg by mouth At Bedtime 2 tablets daily prn       spironolactone (ALDACTONE) 25 MG tablet Take 12.5 mg by mouth daily       warfarin (COUMADIN) 4 MG tablet Take 4 mg by mouth daily Take as directed         ALLERGIES     Allergies   Allergen Reactions     Iodinated Diagnostic Agents [Diagnostic X-Ray Materials]        PAST MEDICAL HISTORY:  No past medical history on file.    PAST SURGICAL HISTORY:  No past surgical history on file.    FAMILY HISTORY:  History reviewed. No pertinent family history.    SOCIAL HISTORY:  Social History     Social History     Marital status:      Spouse name: N/A     Number of children: N/A     Years of education: N/A  "    Social History Main Topics     Smoking status: Never Smoker     Smokeless tobacco: Never Used     Alcohol use None     Drug use: None     Sexual activity: Not Asked     Other Topics Concern     None     Social History Narrative       Review of Systems:  Skin:  Positive for   sore in left leg   Eyes:  Positive for visual blurring    ENT:  Negative for      Respiratory:  Negative for       Cardiovascular:  Negative      Gastroenterology: Positive for diarrhea;dysphagia    Genitourinary:  not assessed      Musculoskeletal:  Positive for arthritis    Neurologic:  Negative for      Psychiatric:  Negative      Heme/Lymph/Imm:  Negative      Endocrine:  Positive for thyroid disorder      Physical Exam:  Vitals: /71  Pulse 73  Ht 1.588 m (5' 2.5\")  Wt 73 kg (161 lb)  BMI 28.98 kg/m2    Constitutional:  cooperative, alert and oriented, well developed, well nourished, in no acute distress        Skin:  warm and dry to the touch, no apparent skin lesions or masses noted          Head:  normocephalic, no masses or lesions        Eyes:  pupils equal and round, conjunctivae and lids unremarkable, sclera white, no xanthalasma, EOMS intact, no nystagmus        Lymph:      ENT:  no pallor or cyanosis, dentition good        Neck:  carotid pulses are full and equal bilaterally, JVP normal, no carotid bruit        Respiratory:  normal breath sounds, clear to auscultation, normal A-P diameter, normal symmetry, normal respiratory excursion, no use of accessory muscles         Cardiac: regular rhythm;normal S1 and S2;apical impulse not displaced   S4            pulses full and equal, no bruits auscultated                                        GI:  abdomen soft, non-tender, BS normoactive, no mass, no HSM, no bruits        Extremities and Muscular Skeletal:  no deformities, clubbing, cyanosis, erythema observed;no edema              Neurological:  no gross motor deficits;affect appropriate        Psych:  Alert and Oriented x " 3        CC  Deion Sorensen MD  [address]  [place] [address]

## 2018-05-18 NOTE — LETTER
5/18/2018    [facility] [address]  [facility] [address]    RE: Mary Coley       Dear Colleague,    I had the pleasure of seeing Mary Coley in the [facility].    HPI and Plan:   See dictation    Orders Placed This Encounter   Procedures     Basic metabolic panel     Basic metabolic panel     Digoxin level     Follow-Up with Cardiologist     EKG 12-lead complete w/read - Clinics (performed today)     Echocardiogram       No orders of the defined types were placed in this encounter.      There are no discontinued medications.      Encounter Diagnoses   Name Primary?     Chronic atrial fibrillation (H)      Other cardiomyopathy (H)        CURRENT MEDICATIONS:  Current Outpatient Prescriptions   Medication Sig Dispense Refill     calcium-vitamin D (CALTRATE 600+D) 600-400 MG-UNIT per tablet Take 1 tablet by mouth 2 times daily       carvedilol (COREG) 25 MG tablet Take 1 tablet (25 mg) by mouth 2 times daily (with meals) 180 tablet 3     clobetasol (TEMOVATE) 0.05 % ointment Apply topically 2 times daily       digoxin (LANOXIN) 125 MCG tablet Take 125 mcg by mouth daily       levothyroxine (SYNTHROID/LEVOTHROID) 75 MCG tablet Take 75 mcg by mouth daily 100 mcg daily       losartan (COZAAR) 100 MG tablet Take 1 tablet (100 mg) by mouth daily 90 tablet 3     Multiple Vitamins-Minerals (PRESERVISION AREDS PO) Take 1 tablet by mouth daily       OMEPRAZOLE PO Take 20 mg by mouth as needed       predniSONE (ABA) 1 MG EC tablet Take 1 mg by mouth At Bedtime 2 tablets daily prn       spironolactone (ALDACTONE) 25 MG tablet Take 12.5 mg by mouth daily       warfarin (COUMADIN) 4 MG tablet Take 4 mg by mouth daily Take as directed         ALLERGIES     Allergies   Allergen Reactions     Iodinated Diagnostic Agents [Diagnostic X-Ray Materials]        PAST MEDICAL HISTORY:  No past medical history on file.    PAST SURGICAL HISTORY:  No past surgical  "history on file.    FAMILY HISTORY:  History reviewed. No pertinent family history.    SOCIAL HISTORY:  Social History     Social History     Marital status:      Spouse name: N/A     Number of children: N/A     Years of education: N/A     Social History Main Topics     Smoking status: Never Smoker     Smokeless tobacco: Never Used     Alcohol use None     Drug use: None     Sexual activity: Not Asked     Other Topics Concern     None     Social History Narrative       Review of Systems:  Skin:  Positive for   sore in left leg   Eyes:  Positive for visual blurring    ENT:  Negative for      Respiratory:  Negative for       Cardiovascular:  Negative      Gastroenterology: Positive for diarrhea;dysphagia    Genitourinary:  not assessed      Musculoskeletal:  Positive for arthritis    Neurologic:  Negative for      Psychiatric:  Negative      Heme/Lymph/Imm:  Negative      Endocrine:  Positive for thyroid disorder      Physical Exam:  Vitals: /71  Pulse 73  Ht 1.588 m (5' 2.5\")  Wt 73 kg (161 lb)  BMI 28.98 kg/m2    Constitutional:  cooperative, alert and oriented, well developed, well nourished, in no acute distress        Skin:  warm and dry to the touch, no apparent skin lesions or masses noted          Head:  normocephalic, no masses or lesions        Eyes:  pupils equal and round, conjunctivae and lids unremarkable, sclera white, no xanthalasma, EOMS intact, no nystagmus        Lymph:      ENT:  no pallor or cyanosis, dentition good        Neck:  carotid pulses are full and equal bilaterally, JVP normal, no carotid bruit        Respiratory:  normal breath sounds, clear to auscultation, normal A-P diameter, normal symmetry, normal respiratory excursion, no use of accessory muscles         Cardiac: regular rhythm;normal S1 and S2;apical impulse not displaced   S4            pulses full and equal, no bruits auscultated                                        GI:  abdomen soft, non-tender, BS " normoactive, no mass, no HSM, no bruits        Extremities and Muscular Skeletal:  no deformities, clubbing, cyanosis, erythema observed;no edema              Neurological:  no gross motor deficits;affect appropriate        Psych:  Alert and Oriented x 3        CC  Deion Sorensen MD  [address]  [place] [address]                Thank you for allowing me to participate in the care of your patient.      Sincerely,     Deion Sorensen MD, MD     [facility]    cc:   Deion Sornesen MD  [address]  [place] [address]

## 2019-01-18 DIAGNOSIS — I48.91 ATRIAL FIBRILLATION (H): ICD-10-CM

## 2019-01-18 DIAGNOSIS — I25.5 ISCHEMIC CARDIOMYOPATHY: ICD-10-CM

## 2019-01-18 RX ORDER — CARVEDILOL 25 MG/1
25 TABLET ORAL 2 TIMES DAILY WITH MEALS
Qty: 180 TABLET | Refills: 3 | Status: SHIPPED | OUTPATIENT
Start: 2019-01-18

## 2019-01-18 RX ORDER — LOSARTAN POTASSIUM 100 MG/1
100 TABLET ORAL DAILY
Qty: 90 TABLET | Refills: 1 | Status: SHIPPED | OUTPATIENT
Start: 2019-01-18

## 2019-03-08 ENCOUNTER — OFFICE VISIT (OUTPATIENT)
Dept: CARDIOLOGY | Facility: CLINIC | Age: 79
End: 2019-03-08
Attending: INTERNAL MEDICINE
Payer: COMMERCIAL

## 2019-03-08 ENCOUNTER — HOSPITAL ENCOUNTER (OUTPATIENT)
Dept: CARDIOLOGY | Facility: CLINIC | Age: 79
Discharge: HOME | End: 2019-03-08
Attending: INTERNAL MEDICINE | Admitting: INTERNAL MEDICINE
Payer: MEDICARE

## 2019-03-08 VITALS
SYSTOLIC BLOOD PRESSURE: 128 MMHG | HEART RATE: 72 BPM | BODY MASS INDEX: 24.4 KG/M2 | HEIGHT: 63 IN | DIASTOLIC BLOOD PRESSURE: 74 MMHG | WEIGHT: 137.7 LBS

## 2019-03-08 DIAGNOSIS — I42.8 OTHER CARDIOMYOPATHY (H): ICD-10-CM

## 2019-03-08 DIAGNOSIS — I48.20 CHRONIC ATRIAL FIBRILLATION (H): ICD-10-CM

## 2019-03-08 LAB
ANION GAP SERPL CALCULATED.3IONS-SCNC: 5 MMOL/L (ref 3–14)
BUN SERPL-MCNC: 19 MG/DL (ref 7–30)
CALCIUM SERPL-MCNC: 8.6 MG/DL (ref 8.5–10.1)
CHLORIDE SERPL-SCNC: 108 MMOL/L (ref 94–109)
CO2 SERPL-SCNC: 27 MMOL/L (ref 20–32)
CREAT SERPL-MCNC: 1.04 MG/DL (ref 0.52–1.04)
DIGOXIN SERPL-MCNC: 0.9 UG/L (ref 0.5–2)
GFR SERPL CREATININE-BSD FRML MDRD: 51 ML/MIN/{1.73_M2}
GLUCOSE SERPL-MCNC: 97 MG/DL (ref 70–99)
POTASSIUM SERPL-SCNC: 3.9 MMOL/L (ref 3.4–5.3)
SODIUM SERPL-SCNC: 140 MMOL/L (ref 133–144)

## 2019-03-08 PROCEDURE — 80048 BASIC METABOLIC PNL TOTAL CA: CPT | Performed by: INTERNAL MEDICINE

## 2019-03-08 PROCEDURE — 80162 ASSAY OF DIGOXIN TOTAL: CPT | Performed by: INTERNAL MEDICINE

## 2019-03-08 PROCEDURE — 36415 COLL VENOUS BLD VENIPUNCTURE: CPT | Performed by: INTERNAL MEDICINE

## 2019-03-08 PROCEDURE — 93306 TTE W/DOPPLER COMPLETE: CPT

## 2019-03-08 PROCEDURE — 99213 OFFICE O/P EST LOW 20 MIN: CPT | Mod: 25 | Performed by: INTERNAL MEDICINE

## 2019-03-08 PROCEDURE — 93306 TTE W/DOPPLER COMPLETE: CPT | Mod: 26 | Performed by: INTERNAL MEDICINE

## 2019-03-08 RX ORDER — SPIRONOLACTONE 25 MG/1
12.5 TABLET ORAL DAILY
Qty: 90 TABLET | Refills: 3 | Status: SHIPPED | OUTPATIENT
Start: 2019-03-08

## 2019-03-08 ASSESSMENT — MIFFLIN-ST. JEOR: SCORE: 1065.79

## 2019-03-08 NOTE — LETTER
3/8/2019      SAGRARIO ROTHMAN  [facility] [address]  [facility] [address]      RE: Mary Coley       Dear Colleague,    I had the pleasure of seeing Mary Coley in the [facility].    Service Date: 03/08/2019      REFERRING PHYSICIAN:  Dr. Sagrario Rothman.        HISTORY OF PRESENT ILLNESS:  It is my pleasure to see your patient, Mary Coley, who is a very pleasant 78-year-old patient with an idiopathic cardiomyopathy.  Initially, it was felt that this might be a rate-related cardiomyopathy and she was switched from atenolol to carvedilol with good rate control.  Also, the patient did have a coronary angiogram showing only trivial coronary artery disease.  Her echocardiogram has shown ejection fractions around the 45% grady and echocardiography today showed EF in the 40%-45% range, which is around the same as it had been previously.  No significant valvular heart disease is present.  She has mild mitral, mild tricuspid and mild to moderate pulmonary regurgitation.  She has trace aortic regurgitation.  The ascending aorta is mildly dilated at 3.9 cm.  She recently had quite a significant urinary tract infection and in fact fell down due to weakness.  She did not in fact lose consciousness.  She was hypotensive with sepsis and a significant amount of her medications were stopped.  They have been reintroduced, but the spironolactone was not reintroduced which is a medication that we would like to continue given that she has a history of cardiomyopathy.  She is anticoagulated with warfarin for the chronic atrial fibrillation.  Her trough digoxin level today was normal at 0.9.  She has mild renal insufficiency with a GFR of 51, BUN of 19 and a creatinine of 1.04.  Her electrolytes are normal with a potassium of 3.9 and a sodium of 140.        She does not have orthopnea, PND or ankle edema.      IMPRESSION:   1.  Idiopathic cardiomyopathy.  Ejection  fraction is in the 40%-45% range which is similar to what it has been previously.   2.  Recent urinary tract infection with significant hypotension and fall.  Spironolactone was stopped at the time and she tells me a lot of her other medications were stopped, but reintroduced.  I would prefer to reintroduce the spironolactone 12.5 mg again as this is an important medication for cardiomyopathy and she has never had problems with it previously.  Obviously, the hypotension in the setting of urinary tract infection was a special case.   3.  Chronic atrial fibrillation with good rate control with a ventricular rate today of 72 beats per minute.  Normotensive with a blood pressure of 128/74.      PLAN:  We will continue the patient on her good present medications.  I will reintroduce the spironolactone at the 12.5 mg oral dose.  I will see the patient back again in 1 year's time and we will repeat her echocardiogram and an EKG at that stage.  As always, she has been told to contact us if she has any questions or concerns, and in particular if she finds she is getting lightheaded or dizzy with the reintroduction of spironolactone she is to stop this and let us know.      Many thanks for allowing me to be involved in the care of this very nice patient.  I look forward to seeing her again.      cc:   [place] [address]         DANY VALDEZ MD, [facility]             D: 2019   T: 2019   MT: ADINA      Name:     JOHANA HAWKINS   MRN:      [id number]        Account:      [id number]   :      1940           Service Date: 2019      Document: [id number]         Outpatient Encounter Medications as of 3/8/2019   Medication Sig Dispense Refill     calcium-vitamin D (CALTRATE 600+D) 600-400 MG-UNIT per tablet Take 1 tablet by mouth 2 times daily       carvedilol (COREG) 25 MG tablet Take 1 tablet (25 mg) by mouth 2 times daily  (with meals) 180 tablet 3     digoxin (LANOXIN) 125 MCG tablet Take 125 mcg by mouth daily       levothyroxine (SYNTHROID/LEVOTHROID) 75 MCG tablet Take 75 mcg by mouth daily 100 mcg daily       losartan (COZAAR) 100 MG tablet Take 1 tablet (100 mg) by mouth daily (Patient taking differently: Take 50 mg by mouth daily ) 90 tablet 1     Multiple Vitamins-Minerals (PRESERVISION AREDS PO) Take 1 tablet by mouth daily       OMEPRAZOLE PO Take 20 mg by mouth as needed       predniSONE (AAB) 1 MG EC tablet Take 1 mg by mouth At Bedtime 2 tablets daily prn       spironolactone (ALDACTONE) 25 MG tablet Take 0.5 tablets (12.5 mg) by mouth daily 90 tablet 3     warfarin (COUMADIN) 4 MG tablet Take 4 mg by mouth daily Take as directed       clobetasol (TEMOVATE) 0.05 % ointment Apply topically 2 times daily       [DISCONTINUED] spironolactone (ALDACTONE) 25 MG tablet Take 12.5 mg by mouth daily       No facility-administered encounter medications on file as of 3/8/2019.        Again, thank you for allowing me to participate in the care of your patient.      Sincerely,    Deion Sorensen MD, MD     [facility]

## 2019-03-08 NOTE — LETTER
3/8/2019    JOSE  YONASCLEM  [facility] [address]  [facility] [address]    RE: Mary Coley       Dear Colleague,    I had the pleasure of seeing Mary Coley in the [facility].    HPI and Plan:   See dictation    Orders Placed This Encounter   Procedures     Basic metabolic panel     Digoxin level     Follow-Up with Cardiologist     EKG 12-lead complete w/read - Clinics (to be scheduled)     Echocardiogram Complete       Orders Placed This Encounter   Medications     spironolactone (ALDACTONE) 25 MG tablet     Sig: Take 0.5 tablets (12.5 mg) by mouth daily     Dispense:  90 tablet     Refill:  3       Medications Discontinued During This Encounter   Medication Reason     spironolactone (ALDACTONE) 25 MG tablet Reorder         Encounter Diagnoses   Name Primary?     Chronic atrial fibrillation (H)      Other cardiomyopathy (H)        CURRENT MEDICATIONS:  Current Outpatient Medications   Medication Sig Dispense Refill     calcium-vitamin D (CALTRATE 600+D) 600-400 MG-UNIT per tablet Take 1 tablet by mouth 2 times daily       carvedilol (COREG) 25 MG tablet Take 1 tablet (25 mg) by mouth 2 times daily (with meals) 180 tablet 3     digoxin (LANOXIN) 125 MCG tablet Take 125 mcg by mouth daily       levothyroxine (SYNTHROID/LEVOTHROID) 75 MCG tablet Take 75 mcg by mouth daily 100 mcg daily       losartan (COZAAR) 100 MG tablet Take 1 tablet (100 mg) by mouth daily (Patient taking differently: Take 50 mg by mouth daily ) 90 tablet 1     Multiple Vitamins-Minerals (PRESERVISION AREDS PO) Take 1 tablet by mouth daily       OMEPRAZOLE PO Take 20 mg by mouth as needed       predniSONE (ABA) 1 MG EC tablet Take 1 mg by mouth At Bedtime 2 tablets daily prn       spironolactone (ALDACTONE) 25 MG tablet Take 0.5 tablets (12.5 mg) by mouth daily 90 tablet 3     warfarin (COUMADIN) 4 MG tablet Take 4 mg by mouth daily Take as directed       clobetasol (TEMOVATE) 0.05  % ointment Apply topically 2 times daily         ALLERGIES     Allergies   Allergen Reactions     Iodinated Diagnostic Agents [Diagnostic X-Ray Materials]        PAST MEDICAL HISTORY:  Past Medical History:   Diagnosis Date     Bilateral lower extremity edema      Cardiomyopathy (H)      CHF (congestive heart failure) (H)      Chronic atrial fibrillation (H)      Compression fracture of T12 vertebra (H)      Fall as cause of accidental injury at home as place of occurrence     compression fracture of T12 vertebrae     Family history of atrial fibrillation      Family history of cardiomyopathy      Family history of coronary artery disease      HTN (hypertension)      Hypothyroidism      Polymyalgia rheumatica (H)        PAST SURGICAL HISTORY:  History reviewed. No pertinent surgical history.    FAMILY HISTORY:  History reviewed. No pertinent family history.    SOCIAL HISTORY:  Social History     Socioeconomic History     Marital status:      Spouse name: None     Number of children: None     Years of education: None     Highest education level: None   Occupational History     None   Social Needs     Financial resource strain: None     Food insecurity:     Worry: None     Inability: None     Transportation needs:     Medical: None     Non-medical: None   Tobacco Use     Smoking status: Never Smoker     Smokeless tobacco: Never Used   Substance and Sexual Activity     Alcohol use: None     Drug use: None     Sexual activity: None   Lifestyle     Physical activity:     Days per week: None     Minutes per session: None     Stress: None   Relationships     Social connections:     Talks on phone: None     Gets together: None     Attends [employer] service: None     Active member of club or organization: None     Attends meetings of clubs or organizations: None     Relationship status: None     Intimate partner violence:     Fear of current or ex partner: None     Emotionally abused: None     Physically abused: None  "    Forced sexual activity: None   Other Topics Concern     None   Social History Narrative     None       Review of Systems:  Skin:          Eyes:         ENT:         Respiratory:  Negative       Cardiovascular:  Negative      Gastroenterology:        Genitourinary:         Musculoskeletal:         Neurologic:         Psychiatric:         Heme/Lymph/Imm:         Endocrine:           Physical Exam:  Vitals: /74 (BP Location: Right arm, Patient Position: Sitting, Cuff Size: Adult Regular)   Pulse 72   Ht 1.588 m (5' 2.5\")   Wt 62.5 kg (137 lb 11.2 oz)   Breastfeeding? No   BMI 24.78 kg/m       Constitutional:  cooperative, alert and oriented, well developed, well nourished, in no acute distress        Skin:  warm and dry to the touch, no apparent skin lesions or masses noted          Head:  normocephalic, no masses or lesions        Eyes:  pupils equal and round, conjunctivae and lids unremarkable, sclera white, no xanthalasma, EOMS intact, no nystagmus        Lymph:      ENT:  no pallor or cyanosis, dentition good        Neck:  carotid pulses are full and equal bilaterally, JVP normal, no carotid bruit        Respiratory:  normal breath sounds, clear to auscultation, normal A-P diameter, normal symmetry, normal respiratory excursion, no use of accessory muscles         Cardiac: regular rhythm;normal S1 and S2;apical impulse not displaced   S4            pulses full and equal, no bruits auscultated                                        GI:  abdomen soft, non-tender, BS normoactive, no mass, no HSM, no bruits        Extremities and Muscular Skeletal:  no deformities, clubbing, cyanosis, erythema observed;no edema              Neurological:  no gross motor deficits;affect appropriate        Psych:  Alert and Oriented x 3        CC  Deion Sorensen MD  [address]  [place] [address]                Thank you for allowing me to participate in the care of your patient.      Sincerely, "     Deion Sorensen MD, MD     [facility]    cc:   Deion Sorensen MD  [address]  [place] [address]

## 2019-03-08 NOTE — PROGRESS NOTES
Service Date: 03/08/2019      REFERRING PHYSICIAN:  Dr. Sagrario Guerin.        HISTORY OF PRESENT ILLNESS:  It is my pleasure to see your patient, Mary Coley, who is a very pleasant 78-year-old patient with an idiopathic cardiomyopathy.  Initially, it was felt that this might be a rate-related cardiomyopathy and she was switched from atenolol to carvedilol with good rate control.  Also, the patient did have a coronary angiogram showing only trivial coronary artery disease.  Her echocardiogram has shown ejection fractions around the 45% grady and echocardiography today showed EF in the 40%-45% range, which is around the same as it had been previously.  No significant valvular heart disease is present.  She has mild mitral, mild tricuspid and mild to moderate pulmonary regurgitation.  She has trace aortic regurgitation.  The ascending aorta is mildly dilated at 3.9 cm.  She recently had quite a significant urinary tract infection and in fact fell down due to weakness.  She did not in fact lose consciousness.  She was hypotensive with sepsis and a significant amount of her medications were stopped.  They have been reintroduced, but the spironolactone was not reintroduced which is a medication that we would like to continue given that she has a history of cardiomyopathy.  She is anticoagulated with warfarin for the chronic atrial fibrillation.  Her trough digoxin level today was normal at 0.9.  She has mild renal insufficiency with a GFR of 51, BUN of 19 and a creatinine of 1.04.  Her electrolytes are normal with a potassium of 3.9 and a sodium of 140.        She does not have orthopnea, PND or ankle edema.      IMPRESSION:   1.  Idiopathic cardiomyopathy.  Ejection fraction is in the 40%-45% range which is similar to what it has been previously.   2.  Recent urinary tract infection with significant hypotension and fall.  Spironolactone was stopped at the time and she tells me a lot of her other medications were  stopped, but reintroduced.  I would prefer to reintroduce the spironolactone 12.5 mg again as this is an important medication for cardiomyopathy and she has never had problems with it previously.  Obviously, the hypotension in the setting of urinary tract infection was a special case.   3.  Chronic atrial fibrillation with good rate control with a ventricular rate today of 72 beats per minute.  Normotensive with a blood pressure of 128/74.      PLAN:  We will continue the patient on her good present medications.  I will reintroduce the spironolactone at the 12.5 mg oral dose.  I will see the patient back again in 1 year's time and we will repeat her echocardiogram and an EKG at that stage.  As always, she has been told to contact us if she has any questions or concerns, and in particular if she finds she is getting lightheaded or dizzy with the reintroduction of spironolactone she is to stop this and let us know.      Many thanks for allowing me to be involved in the care of this very nice patient.  I look forward to seeing her again.      cc:   [place] [address]         DANY VALDEZ MD, [facility]             D: 2019   T: 2019   MT: ADINA      Name:     JOHANA HAWKINS   MRN:      [id number]        Account:      [id number]   :      1940           Service Date: 2019      Document: [id number]

## 2019-03-08 NOTE — PROGRESS NOTES
HPI and Plan:   See dictation    Orders Placed This Encounter   Procedures     Basic metabolic panel     Digoxin level     Follow-Up with Cardiologist     EKG 12-lead complete w/read - Clinics (to be scheduled)     Echocardiogram Complete       Orders Placed This Encounter   Medications     spironolactone (ALDACTONE) 25 MG tablet     Sig: Take 0.5 tablets (12.5 mg) by mouth daily     Dispense:  90 tablet     Refill:  3       Medications Discontinued During This Encounter   Medication Reason     spironolactone (ALDACTONE) 25 MG tablet Reorder         Encounter Diagnoses   Name Primary?     Chronic atrial fibrillation (H)      Other cardiomyopathy (H)        CURRENT MEDICATIONS:  Current Outpatient Medications   Medication Sig Dispense Refill     calcium-vitamin D (CALTRATE 600+D) 600-400 MG-UNIT per tablet Take 1 tablet by mouth 2 times daily       carvedilol (COREG) 25 MG tablet Take 1 tablet (25 mg) by mouth 2 times daily (with meals) 180 tablet 3     digoxin (LANOXIN) 125 MCG tablet Take 125 mcg by mouth daily       levothyroxine (SYNTHROID/LEVOTHROID) 75 MCG tablet Take 75 mcg by mouth daily 100 mcg daily       losartan (COZAAR) 100 MG tablet Take 1 tablet (100 mg) by mouth daily (Patient taking differently: Take 50 mg by mouth daily ) 90 tablet 1     Multiple Vitamins-Minerals (PRESERVISION AREDS PO) Take 1 tablet by mouth daily       OMEPRAZOLE PO Take 20 mg by mouth as needed       predniSONE (ABA) 1 MG EC tablet Take 1 mg by mouth At Bedtime 2 tablets daily prn       spironolactone (ALDACTONE) 25 MG tablet Take 0.5 tablets (12.5 mg) by mouth daily 90 tablet 3     warfarin (COUMADIN) 4 MG tablet Take 4 mg by mouth daily Take as directed       clobetasol (TEMOVATE) 0.05 % ointment Apply topically 2 times daily         ALLERGIES     Allergies   Allergen Reactions     Iodinated Diagnostic Agents [Diagnostic X-Ray [employer]]        PAST MEDICAL HISTORY:  Past Medical History:   Diagnosis Date     Bilateral lower  extremity edema      Cardiomyopathy (H)      CHF (congestive heart failure) (H)      Chronic atrial fibrillation (H)      Compression fracture of T12 vertebra (H)      Fall as cause of accidental injury at home as place of occurrence     compression fracture of T12 vertebrae     Family history of atrial fibrillation      Family history of cardiomyopathy      Family history of coronary artery disease      HTN (hypertension)      Hypothyroidism      Polymyalgia rheumatica (H)        PAST SURGICAL HISTORY:  History reviewed. No pertinent surgical history.    FAMILY HISTORY:  History reviewed. No pertinent family history.    SOCIAL HISTORY:  Social History     Socioeconomic History     Marital status:      Spouse name: None     Number of children: None     Years of education: None     Highest education level: None   Occupational History     None   Social Needs     Financial resource strain: None     Food insecurity:     Worry: None     Inability: None     Transportation needs:     Medical: None     Non-medical: None   Tobacco Use     Smoking status: Never Smoker     Smokeless tobacco: Never Used   Substance and Sexual Activity     Alcohol use: None     Drug use: None     Sexual activity: None   Lifestyle     Physical activity:     Days per week: None     Minutes per session: None     Stress: None   Relationships     Social connections:     Talks on phone: None     Gets together: None     Attends Confucianist service: None     Active member of club or organization: None     Attends meetings of clubs or organizations: None     Relationship status: None     Intimate partner violence:     Fear of current or ex partner: None     Emotionally abused: None     Physically abused: None     Forced sexual activity: None   Other Topics Concern     None   Social History Narrative     None       Review of Systems:  Skin:          Eyes:         ENT:         Respiratory:  Negative       Cardiovascular:  Negative     "  Gastroenterology:        Genitourinary:         Musculoskeletal:         Neurologic:         Psychiatric:         Heme/Lymph/Imm:         Endocrine:           Physical Exam:  Vitals: /74 (BP Location: Right arm, Patient Position: Sitting, Cuff Size: Adult Regular)   Pulse 72   Ht 1.588 m (5' 2.5\")   Wt 62.5 kg (137 lb 11.2 oz)   Breastfeeding? No   BMI 24.78 kg/m      Constitutional:  cooperative, alert and oriented, well developed, well nourished, in no acute distress        Skin:  warm and dry to the touch, no apparent skin lesions or masses noted          Head:  normocephalic, no masses or lesions        Eyes:  pupils equal and round, conjunctivae and lids unremarkable, sclera white, no xanthalasma, EOMS intact, no nystagmus        Lymph:      ENT:  no pallor or cyanosis, dentition good        Neck:  carotid pulses are full and equal bilaterally, JVP normal, no carotid bruit        Respiratory:  normal breath sounds, clear to auscultation, normal A-P diameter, normal symmetry, normal respiratory excursion, no use of accessory muscles         Cardiac: regular rhythm;normal S1 and S2;apical impulse not displaced   S4            pulses full and equal, no bruits auscultated                                        GI:  abdomen soft, non-tender, BS normoactive, no mass, no HSM, no bruits        Extremities and Muscular Skeletal:  no deformities, clubbing, cyanosis, erythema observed;no edema              Neurological:  no gross motor deficits;affect appropriate        Psych:  Alert and Oriented x 3        CC  Deion Sorensen MD  [id number]  [place] [id number]              "

## 2021-01-30 ENCOUNTER — IMMUNIZATION (OUTPATIENT)
Dept: NURSING | Facility: CLINIC | Age: 81
End: 2021-01-30
Payer: COMMERCIAL

## 2021-01-30 PROCEDURE — 91300 PR COVID VAC PFIZER DIL RECON 30 MCG/0.3 ML IM: CPT

## 2021-01-30 PROCEDURE — 0001A PR COVID VAC PFIZER DIL RECON 30 MCG/0.3 ML IM: CPT

## 2021-02-20 ENCOUNTER — IMMUNIZATION (OUTPATIENT)
Dept: NURSING | Facility: CLINIC | Age: 81
End: 2021-02-20
Payer: COMMERCIAL

## 2021-02-20 PROCEDURE — 91300 PR COVID VAC PFIZER DIL RECON 30 MCG/0.3 ML IM: CPT

## 2021-02-20 PROCEDURE — 0002A PR COVID VAC PFIZER DIL RECON 30 MCG/0.3 ML IM: CPT
